# Patient Record
Sex: MALE | Race: WHITE | NOT HISPANIC OR LATINO | URBAN - METROPOLITAN AREA
[De-identification: names, ages, dates, MRNs, and addresses within clinical notes are randomized per-mention and may not be internally consistent; named-entity substitution may affect disease eponyms.]

---

## 2021-10-18 ENCOUNTER — OFFICE VISIT (OUTPATIENT)
Dept: URBAN - METROPOLITAN AREA CLINIC 121 | Facility: CLINIC | Age: 71
End: 2021-10-18

## 2022-05-23 ENCOUNTER — OFFICE VISIT (OUTPATIENT)
Dept: URBAN - METROPOLITAN AREA CLINIC 60 | Facility: CLINIC | Age: 72
End: 2022-05-23

## 2022-06-08 ENCOUNTER — OFFICE VISIT (OUTPATIENT)
Dept: URBAN - METROPOLITAN AREA SURGERY CENTER 4 | Facility: SURGERY CENTER | Age: 72
End: 2022-06-08

## 2022-06-13 LAB — PATHOLOGY (INDENTED REPORT): (no result)

## 2022-07-09 ENCOUNTER — TELEPHONE ENCOUNTER (OUTPATIENT)
Dept: URBAN - METROPOLITAN AREA CLINIC 121 | Facility: CLINIC | Age: 72
End: 2022-07-09

## 2022-07-09 RX ORDER — LISINOPRIL 20 MG/1
TABLET ORAL
Refills: 0 | OUTPATIENT
Start: 2016-09-29 | End: 2016-10-24

## 2022-07-09 RX ORDER — VITAMIN E 200 UNIT
CAPSULE ORAL
Refills: 0 | OUTPATIENT
Start: 2016-09-29 | End: 2016-10-24

## 2022-07-09 RX ORDER — GABAPENTIN 300 MG/1
CAPSULE ORAL THREE TIMES A DAY
Refills: 0 | OUTPATIENT
Start: 2016-09-29 | End: 2016-10-24

## 2022-07-09 RX ORDER — ALLOPURINOL 300 MG/1
TABLET ORAL
Refills: 0 | OUTPATIENT
Start: 2016-09-29 | End: 2016-10-24

## 2022-07-09 RX ORDER — GABAPENTIN 300 MG/1
CAPSULE ORAL
Refills: 0 | OUTPATIENT
Start: 2016-09-29 | End: 2016-09-29

## 2022-07-09 RX ORDER — HYDROCHLOROTHIAZIDE 25 MG/1
TABLET ORAL
Refills: 0 | OUTPATIENT
Start: 2016-09-29 | End: 2016-10-24

## 2022-07-09 RX ORDER — DEXTROSE 4 G
TABLET,CHEWABLE ORAL
Refills: 0 | OUTPATIENT
Start: 2016-09-29 | End: 2016-10-24

## 2022-07-09 RX ORDER — VARDENAFIL HCL 20 MG
TABLET ORAL
Refills: 0 | OUTPATIENT
Start: 2016-09-29 | End: 2016-10-24

## 2022-07-10 ENCOUNTER — TELEPHONE ENCOUNTER (OUTPATIENT)
Dept: URBAN - METROPOLITAN AREA CLINIC 121 | Facility: CLINIC | Age: 72
End: 2022-07-10

## 2022-07-10 RX ORDER — VITAMIN E 200 UNIT
CAPSULE ORAL
Refills: 0 | Status: ACTIVE | COMMUNITY
Start: 2016-10-24

## 2022-07-10 RX ORDER — VARDENAFIL HCL 20 MG
TABLET ORAL
Refills: 0 | Status: ACTIVE | COMMUNITY
Start: 2016-10-24

## 2022-07-10 RX ORDER — HYDROCHLOROTHIAZIDE 25 MG/1
TABLET ORAL
Refills: 0 | Status: ACTIVE | COMMUNITY
Start: 2016-10-24

## 2022-07-10 RX ORDER — GABAPENTIN 300 MG/1
CAPSULE ORAL THREE TIMES A DAY
Refills: 0 | Status: ACTIVE | COMMUNITY
Start: 2016-10-24

## 2022-07-10 RX ORDER — POLYETHYLENE GLYCOL-3350 AND ELECTROLYTES WITH FLAVOR PACK 240; 5.84; 2.98; 6.72; 22.72 G/278.26G; G/278.26G; G/278.26G; G/278.26G; G/278.26G
FOUR TIMES A DAY POWDER, FOR SOLUTION ORAL
Refills: 0 | Status: ACTIVE | COMMUNITY
Start: 2022-05-23

## 2022-07-10 RX ORDER — LISINOPRIL 20 MG/1
TABLET ORAL
Refills: 0 | Status: ACTIVE | COMMUNITY
Start: 2016-10-24

## 2022-07-10 RX ORDER — ALLOPURINOL 300 MG/1
TABLET ORAL
Refills: 0 | Status: ACTIVE | COMMUNITY
Start: 2016-10-24

## 2022-07-10 RX ORDER — ONDANSETRON HYDROCHLORIDE 4 MG/1
TAKE AS DIRECTED ONE TABLET 30MIN BEFORE PREP AND THAN 4 HRS LATER TABLET, FILM COATED ORAL TAKE AS DIRECTED
Refills: 0 | Status: ACTIVE | COMMUNITY
Start: 2022-05-23

## 2022-07-10 RX ORDER — DEXTROSE 4 G
TABLET,CHEWABLE ORAL
Refills: 0 | Status: ACTIVE | COMMUNITY
Start: 2016-10-24

## 2022-07-11 ENCOUNTER — OFFICE VISIT (OUTPATIENT)
Dept: URBAN - METROPOLITAN AREA CLINIC 60 | Facility: CLINIC | Age: 72
End: 2022-07-11
Payer: MEDICARE

## 2022-07-11 ENCOUNTER — WEB ENCOUNTER (OUTPATIENT)
Dept: URBAN - METROPOLITAN AREA CLINIC 60 | Facility: CLINIC | Age: 72
End: 2022-07-11

## 2022-07-11 ENCOUNTER — DASHBOARD ENCOUNTERS (OUTPATIENT)
Age: 72
End: 2022-07-11

## 2022-07-11 VITALS
DIASTOLIC BLOOD PRESSURE: 80 MMHG | OXYGEN SATURATION: 96 % | HEART RATE: 82 BPM | SYSTOLIC BLOOD PRESSURE: 130 MMHG | BODY MASS INDEX: 29.66 KG/M2 | WEIGHT: 219 LBS | HEIGHT: 72 IN | RESPIRATION RATE: 20 BRPM | TEMPERATURE: 97.9 F

## 2022-07-11 DIAGNOSIS — Z86.010 PERSONAL HISTORY OF COLONIC POLYPS: ICD-10-CM

## 2022-07-11 DIAGNOSIS — E66.3 OVERWEIGHT: ICD-10-CM

## 2022-07-11 DIAGNOSIS — R79.9 ABNORMAL BLOOD CHEMISTRY: ICD-10-CM

## 2022-07-11 PROBLEM — 428283002: Status: ACTIVE | Noted: 2022-07-11

## 2022-07-11 PROCEDURE — 99213 OFFICE O/P EST LOW 20 MIN: CPT | Performed by: INTERNAL MEDICINE

## 2022-07-11 RX ORDER — LORATADINE 10 MG
1 TABLET TABLET ORAL ONCE A DAY
Status: ACTIVE | COMMUNITY

## 2022-07-11 RX ORDER — LISINOPRIL 20 MG/1
1 TABLET TABLET ORAL ONCE A DAY
Status: ACTIVE | COMMUNITY

## 2022-07-11 RX ORDER — ALLOPURINOL 300 MG/1
1 TABLET TABLET ORAL ONCE A DAY
Status: ACTIVE | COMMUNITY

## 2022-07-11 RX ORDER — ONDANSETRON 4 MG/1
1 TABLET ON THE TONGUE AND ALLOW TO DISSOLVE TABLET, ORALLY DISINTEGRATING ORAL ONCE A DAY
Status: ACTIVE | COMMUNITY

## 2022-07-11 RX ORDER — GABAPENTIN 300 MG/1
1 CAPSULE CAPSULE ORAL ONCE A DAY
Status: ACTIVE | COMMUNITY

## 2022-07-11 RX ORDER — VARDENAFIL HCL 20 MG
1 TABLET 60 MINUTES BEFORE SEXUAL ACTIVITY AS NEEDED TABLET ORAL ONCE A DAY
Status: ACTIVE | COMMUNITY

## 2022-07-11 RX ORDER — HYDROCHLOROTHIAZIDE 25 MG/1
1 TABLET IN THE MORNING TABLET ORAL ONCE A DAY
Status: ACTIVE | COMMUNITY

## 2022-07-11 RX ORDER — ASPIRIN AND EXTENDED - RELEASE DIPYRIDAMOLE 25; 200 MG/1; MG/1
1 CAPSULE CAPSULE ORAL TWICE A DAY
Status: ACTIVE | COMMUNITY

## 2022-07-11 NOTE — HPI-TODAY'S VISIT:
Josue is a pleasant 71-year-old male who presents today for procedure follow up. History of tubular adenoma. Recommended patient follow up with PCP regarding thyroid findings  Colonoscopy dated 6/8/2022 showed a 5 mm hyperplastic polyp removed from the hepatic flexure.  Nonbleeding internal hemorrhoids.  Labs dated 12/9/2021 showed glucose 108, creatinine 1.34 (H), GFR 53 (H).  Normal hepatic function panel.  Normal TSH.  T4 4.30 (L).  Normal CBC  Pt  doing well denies any rectal bleeding ,constipation,heartburn, dysphagia or wt loss

## 2022-07-15 LAB
BUN/CREATININE RATIO: 19
BUN: 28
CARBON DIOXIDE, TOTAL: 24
CHLORIDE: 103
CREATININE: 1.5
EGFR: 49
GLUCOSE: 111
POTASSIUM: 5.1
SODIUM: 138